# Patient Record
Sex: FEMALE | Race: BLACK OR AFRICAN AMERICAN | NOT HISPANIC OR LATINO | URBAN - METROPOLITAN AREA
[De-identification: names, ages, dates, MRNs, and addresses within clinical notes are randomized per-mention and may not be internally consistent; named-entity substitution may affect disease eponyms.]

---

## 2024-07-25 DIAGNOSIS — I71.21 ASCENDING AORTIC ANEURYSM: Primary | ICD-10-CM

## 2024-08-08 ENCOUNTER — OFFICE VISIT (OUTPATIENT)
Dept: CARDIAC SURGERY | Facility: CLINIC | Age: 74
End: 2024-08-08
Payer: MEDICARE

## 2024-08-08 VITALS
HEIGHT: 63 IN | SYSTOLIC BLOOD PRESSURE: 106 MMHG | DIASTOLIC BLOOD PRESSURE: 77 MMHG | OXYGEN SATURATION: 99 % | HEART RATE: 83 BPM | BODY MASS INDEX: 38.45 KG/M2 | WEIGHT: 217 LBS

## 2024-08-08 DIAGNOSIS — I77.810 AORTIC ECTASIA, THORACIC: ICD-10-CM

## 2024-08-08 DIAGNOSIS — I71.21 ASCENDING AORTIC ANEURYSM, UNSPECIFIED WHETHER RUPTURED: Primary | ICD-10-CM

## 2024-08-08 PROCEDURE — 99204 OFFICE O/P NEW MOD 45 MIN: CPT | Mod: ,,, | Performed by: THORACIC SURGERY (CARDIOTHORACIC VASCULAR SURGERY)

## 2024-08-08 PROCEDURE — 1126F AMNT PAIN NOTED NONE PRSNT: CPT | Mod: CPTII,,, | Performed by: THORACIC SURGERY (CARDIOTHORACIC VASCULAR SURGERY)

## 2024-08-08 PROCEDURE — 3078F DIAST BP <80 MM HG: CPT | Mod: CPTII,,, | Performed by: THORACIC SURGERY (CARDIOTHORACIC VASCULAR SURGERY)

## 2024-08-08 PROCEDURE — 1101F PT FALLS ASSESS-DOCD LE1/YR: CPT | Mod: CPTII,,, | Performed by: THORACIC SURGERY (CARDIOTHORACIC VASCULAR SURGERY)

## 2024-08-08 PROCEDURE — 1160F RVW MEDS BY RX/DR IN RCRD: CPT | Mod: CPTII,,, | Performed by: THORACIC SURGERY (CARDIOTHORACIC VASCULAR SURGERY)

## 2024-08-08 PROCEDURE — 3288F FALL RISK ASSESSMENT DOCD: CPT | Mod: CPTII,,, | Performed by: THORACIC SURGERY (CARDIOTHORACIC VASCULAR SURGERY)

## 2024-08-08 PROCEDURE — 3074F SYST BP LT 130 MM HG: CPT | Mod: CPTII,,, | Performed by: THORACIC SURGERY (CARDIOTHORACIC VASCULAR SURGERY)

## 2024-08-08 PROCEDURE — 1159F MED LIST DOCD IN RCRD: CPT | Mod: CPTII,,, | Performed by: THORACIC SURGERY (CARDIOTHORACIC VASCULAR SURGERY)

## 2024-08-08 RX ORDER — PREDNISONE 50 MG/1
TABLET ORAL
COMMUNITY
Start: 2024-06-28

## 2024-08-08 RX ORDER — OMEPRAZOLE 40 MG/1
1 CAPSULE, DELAYED RELEASE ORAL EVERY MORNING
COMMUNITY

## 2024-08-08 RX ORDER — LINACLOTIDE 145 UG/1
CAPSULE, GELATIN COATED ORAL
COMMUNITY

## 2024-08-08 RX ORDER — CLONAZEPAM 1 MG/1
TABLET ORAL
COMMUNITY

## 2024-08-08 RX ORDER — ATORVASTATIN CALCIUM 10 MG/1
1 TABLET, FILM COATED ORAL NIGHTLY
COMMUNITY

## 2024-08-08 RX ORDER — AZELASTINE 1 MG/ML
SPRAY, METERED NASAL
COMMUNITY

## 2024-08-08 RX ORDER — FUROSEMIDE 20 MG/1
TABLET ORAL
COMMUNITY
Start: 2024-03-27

## 2024-08-08 RX ORDER — DENOSUMAB 60 MG/ML
INJECTION SUBCUTANEOUS
COMMUNITY
Start: 2024-07-01

## 2024-08-08 RX ORDER — CLONIDINE HYDROCHLORIDE 0.1 MG/1
TABLET ORAL
COMMUNITY

## 2024-08-08 RX ORDER — MELOXICAM 15 MG/1
1 TABLET ORAL DAILY PRN
COMMUNITY

## 2024-08-08 RX ORDER — PREGABALIN 150 MG/1
1 CAPSULE ORAL 3 TIMES DAILY
COMMUNITY

## 2024-08-08 RX ORDER — ZOLPIDEM TARTRATE 6.25 MG/1
TABLET, FILM COATED, EXTENDED RELEASE ORAL
COMMUNITY

## 2024-08-08 RX ORDER — MONTELUKAST SODIUM 10 MG/1
1 TABLET ORAL DAILY
COMMUNITY

## 2024-08-08 RX ORDER — LISDEXAMFETAMINE DIMESYLATE 50 MG/1
CAPSULE ORAL
COMMUNITY
Start: 2024-03-13

## 2024-08-08 RX ORDER — CETIRIZINE HYDROCHLORIDE 10 MG/1
10 TABLET ORAL
COMMUNITY
Start: 2024-07-25

## 2024-08-08 RX ORDER — LEVOCETIRIZINE DIHYDROCHLORIDE 5 MG/1
1 TABLET, FILM COATED ORAL NIGHTLY
COMMUNITY

## 2024-08-08 RX ORDER — ALLOPURINOL 300 MG/1
1 TABLET ORAL DAILY
COMMUNITY
Start: 2024-03-27

## 2024-08-08 RX ORDER — AMOXICILLIN 500 MG/1
CAPSULE ORAL
COMMUNITY
Start: 2024-05-23 | End: 2024-08-08

## 2024-08-08 RX ORDER — OLMESARTAN MEDOXOMIL AND HYDROCHLOROTHIAZIDE 40/25 40; 25 MG/1; MG/1
1 TABLET ORAL DAILY
COMMUNITY
Start: 2024-03-27

## 2024-08-08 RX ORDER — AMLODIPINE BESYLATE 5 MG/1
1 TABLET ORAL DAILY
COMMUNITY

## 2024-08-08 NOTE — PROGRESS NOTES
[facility]  Cardiothoracic Surgery  Consult Note    Patient Name: Beverly Stapleton  MRN: [id number]  Date of Service: 8/11/2024  Referring Provider: Troy Garcias MD    Patient information was obtained from patient, past medical records, and primary team    Subjective:     Chief Complaint   Patient presents with    Pre-op Exam     REF DR. GARCIAS, EVAL Asc & Desc aortic aneurysm.  CTA CHEST 7/8/24- Asc Aortic Aneurysm 4.7cm, Prox desc Thoracic 3.8cm.  PMH: HTN, DYSLIPIDEMIA          History of Present Illness:  Patient is a 74-year-old woman with a recent finding of prominent ascending aorta measuring 4.7 cm on recent CTA.  This is consistent with ascending aortic ectasia.  The patient has not had an echocardiogram to assess aortic valve pathology.      She denies fever, chills, nausea, vomiting, headache, syncope, chest pain, back pain, palpitations, or family history of aortic pathology.    Current Outpatient Medications on File Prior to Visit   Medication Sig    allopurinoL (ZYLOPRIM) 300 MG tablet Take 1 tablet by mouth once daily.    amLODIPine (NORVASC) 5 MG tablet Take 1 tablet by mouth once daily.    atorvastatin (LIPITOR) 10 MG tablet Take 1 tablet by mouth every evening.    azelastine (ASTELIN) 137 mcg (0.1 %) nasal spray SPRAY TWICE IN EACH NOSTRIL EVERY MORNING    cetirizine (ZYRTEC) 10 MG tablet Take 10 mg by mouth.    clonazePAM (KLONOPIN) 1 MG tablet TAKE 1/2 TABLET BY MOUTH EVERY MORNING AND 1 TABLET EVERY NIGHT AT BEDTIME    cloNIDine (CATAPRES) 0.1 MG tablet TAKE ONE TABLET BY MOUTH EVERY 2 HOURS AS NEEDED FOR BLOOD PRESSURE GREATER THAN 170/100    furosemide (LASIX) 20 MG tablet TAKE 1 TABLET BY MOUTH DAILY FOR 5 DAYS THEN AS NEEDED SWELLING    levocetirizine (XYZAL) 5 MG tablet Take 1 tablet by mouth every evening.    LINZESS 145 mcg Cap capsule Take 1 capsule every day by oral route before meals.    montelukast (SINGULAIR) 10 mg tablet Take 1 tablet by mouth once  daily.    olmesartan-hydrochlorothiazide (BENICAR HCT) 40-25 mg per tablet Take 1 tablet by mouth once daily.    omeprazole (PRILOSEC) 40 MG capsule Take 1 capsule by mouth every morning.    predniSONE (DELTASONE) 50 MG Tab Take by mouth.    pregabalin (LYRICA) 150 MG capsule Take 1 capsule by mouth 3 (three) times daily.    PROLIA 60 mg/mL Syrg use as directed    zolpidem (AMBIEN CR) 6.25 MG CR tablet     lisdexamfetamine (VYVANSE) 50 MG capsule Take 1 capsule every day by oral route in the morning. (Patient not taking: Reported on 8/8/2024)    meloxicam (MOBIC) 15 MG tablet Take 1 tablet by mouth daily as needed. (Patient not taking: Reported on 8/8/2024)     No current facility-administered medications on file prior to visit.       Review of patient's allergies indicates:  No Known Allergies    Past Medical History:   Diagnosis Date    AAA (abdominal aortic aneurysm)     Breast cancer     Hyperlipidemia     Hypertension      Past Surgical History:   Procedure Laterality Date    CATARACT EXTRACTION      TOTAL KNEE ARTHROPLASTY       Family History    None       Tobacco Use    Smoking status: Never    Smokeless tobacco: Never   Substance and Sexual Activity    Alcohol use: Not Currently    Drug use: Not on file    Sexual activity: Not Currently     Review of Systems   Constitutional: Negative. Negative for chills, diaphoresis, fever and night sweats.   HENT:  Negative for hoarse voice, sore throat and stridor.    Eyes: Negative.  Negative for blurred vision and double vision.   Cardiovascular:  Negative for chest pain, claudication, cyanosis, dyspnea on exertion, irregular heartbeat, leg swelling, orthopnea, palpitations, paroxysmal nocturnal dyspnea and syncope.   Respiratory:  Negative for cough, hemoptysis, shortness of breath, sputum production and wheezing.    Endocrine: Negative for polydipsia and polyuria.   Hematologic/Lymphatic: Negative for adenopathy and bleeding problem.   Gastrointestinal:  Negative  for abdominal pain, diarrhea, heartburn, hematemesis, hematochezia, nausea and vomiting.   Neurological:  Negative for brief paralysis, disturbances in coordination, dizziness, focal weakness, headaches, numbness and paresthesias.     Objective:     Vitals:    08/08/24 1030   BP: 106/77   Pulse: 83        Weight: 98.4 kg (217 lb)  Body mass index is 38.44 kg/m².     STS Risk Score:  Not applicable    Physical Exam  Vitals and nursing note reviewed.   Constitutional:       Appearance: Normal appearance. She is obese.   HENT:      Head: Normocephalic and atraumatic.      Mouth/Throat:      Mouth: Mucous membranes are moist.   Eyes:      Extraocular Movements: Extraocular movements intact.      Conjunctiva/sclera: Conjunctivae normal.      Pupils: Pupils are equal, round, and reactive to light.   Neck:      Vascular: No carotid bruit.   Cardiovascular:      Rate and Rhythm: Normal rate and regular rhythm.      Pulses: Normal pulses.      Heart sounds: Normal heart sounds. No murmur heard.     No friction rub. No gallop.   Pulmonary:      Effort: Pulmonary effort is normal. No respiratory distress.      Breath sounds: Normal breath sounds. No wheezing, rhonchi or rales.   Abdominal:      General: Abdomen is flat. Bowel sounds are normal. There is no distension.      Palpations: Abdomen is soft. There is no mass.      Tenderness: There is no abdominal tenderness.   Musculoskeletal:      Cervical back: Normal range of motion and neck supple.   Lymphadenopathy:      Cervical: No cervical adenopathy.   Skin:     General: Skin is warm and dry.      Capillary Refill: Capillary refill takes less than 2 seconds.      Findings: No erythema or rash.   Neurological:      General: No focal deficit present.      Mental Status: She is alert and oriented to person, place, and time. Mental status is at baseline.          Significant Labs:  Reviewed    Significant Diagnostics:  Reviewed    Assessment/Plan:     Problem List Items  Addressed This Visit          Cardiac/Vascular    Aortic ectasia, thoracic     Recommend echocardiogram to assess aortic valve pathology.  Return to clinic in 6 months with CTA of the chest.          Other Visit Diagnoses       Ascending aortic aneurysm, unspecified whether ruptured    -  Primary    Relevant Orders    Echo    CTA Chest Aorta Non Coronary             Thank you for your consult. I will follow-up with patient. Please contact us if you have any additional questions.    PARIS Wetzel MD, FACC, FACS  Cardiothoracic Surgery  [facility]

## 2024-08-11 PROBLEM — I77.810 AORTIC ECTASIA, THORACIC: Status: ACTIVE | Noted: 2024-08-11

## 2024-08-11 NOTE — ASSESSMENT & PLAN NOTE
Recommend echocardiogram to assess aortic valve pathology.  Return to clinic in 6 months with CTA of the chest.

## 2025-02-07 ENCOUNTER — HOSPITAL ENCOUNTER (OUTPATIENT)
Dept: RADIOLOGY | Facility: HOSPITAL | Age: 75
Discharge: HOME | End: 2025-02-07
Attending: THORACIC SURGERY (CARDIOTHORACIC VASCULAR SURGERY)
Payer: MEDICARE

## 2025-02-07 DIAGNOSIS — I71.21 ASCENDING AORTIC ANEURYSM, UNSPECIFIED WHETHER RUPTURED: ICD-10-CM

## 2025-02-07 PROCEDURE — 71275 CT ANGIOGRAPHY CHEST: CPT | Mod: TC

## 2025-02-07 PROCEDURE — 25500020 PHARM REV CODE 255: Performed by: THORACIC SURGERY (CARDIOTHORACIC VASCULAR SURGERY)

## 2025-02-07 RX ADMIN — IOHEXOL 100 ML: 350 INJECTION, SOLUTION INTRAVENOUS at 08:02

## 2025-05-15 ENCOUNTER — OFFICE VISIT (OUTPATIENT)
Dept: CARDIAC SURGERY | Facility: CLINIC | Age: 75
End: 2025-05-15
Payer: MEDICARE

## 2025-05-15 VITALS
HEIGHT: 63 IN | WEIGHT: 200 LBS | OXYGEN SATURATION: 97 % | HEART RATE: 86 BPM | SYSTOLIC BLOOD PRESSURE: 128 MMHG | DIASTOLIC BLOOD PRESSURE: 88 MMHG | BODY MASS INDEX: 35.44 KG/M2

## 2025-05-15 DIAGNOSIS — I77.810 AORTIC ECTASIA, THORACIC: ICD-10-CM

## 2025-05-15 DIAGNOSIS — I71.21 ASCENDING AORTIC ANEURYSM, UNSPECIFIED WHETHER RUPTURED: Primary | ICD-10-CM

## 2025-05-15 PROCEDURE — 1160F RVW MEDS BY RX/DR IN RCRD: CPT | Mod: CPTII,,, | Performed by: THORACIC SURGERY (CARDIOTHORACIC VASCULAR SURGERY)

## 2025-05-15 PROCEDURE — 3079F DIAST BP 80-89 MM HG: CPT | Mod: CPTII,,, | Performed by: THORACIC SURGERY (CARDIOTHORACIC VASCULAR SURGERY)

## 2025-05-15 PROCEDURE — 1159F MED LIST DOCD IN RCRD: CPT | Mod: CPTII,,, | Performed by: THORACIC SURGERY (CARDIOTHORACIC VASCULAR SURGERY)

## 2025-05-15 PROCEDURE — 1126F AMNT PAIN NOTED NONE PRSNT: CPT | Mod: CPTII,,, | Performed by: THORACIC SURGERY (CARDIOTHORACIC VASCULAR SURGERY)

## 2025-05-15 PROCEDURE — 1101F PT FALLS ASSESS-DOCD LE1/YR: CPT | Mod: CPTII,,, | Performed by: THORACIC SURGERY (CARDIOTHORACIC VASCULAR SURGERY)

## 2025-05-15 PROCEDURE — 99214 OFFICE O/P EST MOD 30 MIN: CPT | Mod: ,,, | Performed by: THORACIC SURGERY (CARDIOTHORACIC VASCULAR SURGERY)

## 2025-05-15 PROCEDURE — 3288F FALL RISK ASSESSMENT DOCD: CPT | Mod: CPTII,,, | Performed by: THORACIC SURGERY (CARDIOTHORACIC VASCULAR SURGERY)

## 2025-05-15 PROCEDURE — 3074F SYST BP LT 130 MM HG: CPT | Mod: CPTII,,, | Performed by: THORACIC SURGERY (CARDIOTHORACIC VASCULAR SURGERY)

## 2025-05-20 NOTE — ASSESSMENT & PLAN NOTE
Recommend echocardiogram to assess aortic valve pathology.  Return to clinic in 1y with CTA of the chest.

## 2025-05-20 NOTE — PROGRESS NOTES
[facility]  Cardiothoracic Surgery  Consult Note    Patient Name: Beverly Stapleton  MRN: [id number]  Date of Service: 5/20/2025  Referring Provider: Order, Paper    Patient information was obtained from patient, past medical records, and primary team    Subjective:     Chief Complaint   Patient presents with    Follow-up     ^6 MO F/U WITH RESULTS CTA CHEST   CTA CHEST AO (2/10/2025): 4.7cm  PREVIOUS CTA CHEST 7/8/24- Asc Aortic Aneurysm 4.7cm, Prox desc Thoracic 3.8cm.  PMH: HTN, DYSLIPIDEMIA          History of Present Illness:  Patient is a 74-year-old woman with a recent finding of prominent ascending aorta measuring 4.7 cm on recent CTA.  This is consistent with ascending aortic ectasia.  The patient has not had an echocardiogram to assess aortic valve pathology.      She denies fever, chills, nausea, vomiting, headache, syncope, chest pain, back pain, palpitations, or family history of aortic pathology.    Current Outpatient Medications on File Prior to Visit   Medication Sig    allopurinoL (ZYLOPRIM) 300 MG tablet Take 1 tablet by mouth once daily.    amLODIPine (NORVASC) 5 MG tablet Take 1 tablet by mouth once daily.    atorvastatin (LIPITOR) 10 MG tablet Take 1 tablet by mouth every evening.    azelastine (ASTELIN) 137 mcg (0.1 %) nasal spray SPRAY TWICE IN EACH NOSTRIL EVERY MORNING    cetirizine (ZYRTEC) 10 MG tablet Take 10 mg by mouth.    clonazePAM (KLONOPIN) 1 MG tablet TAKE 1/2 TABLET BY MOUTH EVERY MORNING AND 1 TABLET EVERY NIGHT AT BEDTIME    cloNIDine (CATAPRES) 0.1 MG tablet TAKE ONE TABLET BY MOUTH EVERY 2 HOURS AS NEEDED FOR BLOOD PRESSURE GREATER THAN 170/100    furosemide (LASIX) 20 MG tablet TAKE 1 TABLET BY MOUTH DAILY FOR 5 DAYS THEN AS NEEDED SWELLING    levocetirizine (XYZAL) 5 MG tablet Take 1 tablet by mouth every evening.    LINZESS 145 mcg Cap capsule Take 1 capsule every day by oral route before meals.    montelukast (SINGULAIR) 10 mg tablet Take 1 tablet by  mouth once daily.    olmesartan-hydrochlorothiazide (BENICAR HCT) 40-25 mg per tablet Take 1 tablet by mouth once daily.    omeprazole (PRILOSEC) 40 MG capsule Take 1 capsule by mouth every morning.    predniSONE (DELTASONE) 50 MG Tab Take by mouth.    pregabalin (LYRICA) 150 MG capsule Take 1 capsule by mouth 3 (three) times daily.    PROLIA 60 mg/mL Syrg use as directed    zolpidem (AMBIEN CR) 6.25 MG CR tablet     lisdexamfetamine (VYVANSE) 50 MG capsule Take 1 capsule every day by oral route in the morning. (Patient not taking: Reported on 5/15/2025)    meloxicam (MOBIC) 15 MG tablet Take 1 tablet by mouth daily as needed. (Patient not taking: Reported on 5/15/2025)     No current facility-administered medications on file prior to visit.       Review of patient's allergies indicates:  No Known Allergies    Past Medical History:   Diagnosis Date    AAA (abdominal aortic aneurysm)     Breast cancer     Hyperlipidemia     Hypertension      Past Surgical History:   Procedure Laterality Date    CATARACT EXTRACTION      PARATHYROIDECTOMY Right     TOTAL KNEE ARTHROPLASTY       Family History    None       Tobacco Use    Smoking status: Never    Smokeless tobacco: Never   Substance and Sexual Activity    Alcohol use: Not Currently    Drug use: Not on file    Sexual activity: Not Currently     Review of Systems   Constitutional: Negative. Negative for chills, diaphoresis, fever and night sweats.   HENT:  Negative for hoarse voice, sore throat and stridor.    Eyes: Negative.  Negative for blurred vision and double vision.   Cardiovascular:  Negative for chest pain, claudication, cyanosis, dyspnea on exertion, irregular heartbeat, leg swelling, orthopnea, palpitations, paroxysmal nocturnal dyspnea and syncope.   Respiratory:  Negative for cough, hemoptysis, shortness of breath, sputum production and wheezing.    Endocrine: Negative for polydipsia and polyuria.   Hematologic/Lymphatic: Negative for adenopathy and bleeding  problem.   Gastrointestinal:  Negative for abdominal pain, diarrhea, heartburn, hematemesis, hematochezia, nausea and vomiting.   Neurological:  Negative for brief paralysis, disturbances in coordination, dizziness, focal weakness, headaches, numbness and paresthesias.     Objective:     Vitals:    05/15/25 0946   BP: 128/88   Pulse: 86        Weight: 90.7 kg (200 lb)  Body mass index is 35.43 kg/m².     STS Risk Score:  Not applicable    Physical Exam  Vitals and nursing note reviewed.   Constitutional:       Appearance: Normal appearance. She is obese.   HENT:      Head: Normocephalic and atraumatic.      Mouth/Throat:      Mouth: Mucous membranes are moist.   Eyes:      Extraocular Movements: Extraocular movements intact.      Conjunctiva/sclera: Conjunctivae normal.      Pupils: Pupils are equal, round, and reactive to light.   Neck:      Vascular: No carotid bruit.   Cardiovascular:      Rate and Rhythm: Normal rate and regular rhythm.      Pulses: Normal pulses.      Heart sounds: Normal heart sounds. No murmur heard.     No friction rub. No gallop.   Pulmonary:      Effort: Pulmonary effort is normal. No respiratory distress.      Breath sounds: Normal breath sounds. No wheezing, rhonchi or rales.   Abdominal:      General: Abdomen is flat. Bowel sounds are normal. There is no distension.      Palpations: Abdomen is soft. There is no mass.      Tenderness: There is no abdominal tenderness.   Musculoskeletal:      Cervical back: Normal range of motion and neck supple.   Lymphadenopathy:      Cervical: No cervical adenopathy.   Skin:     General: Skin is warm and dry.      Capillary Refill: Capillary refill takes less than 2 seconds.      Findings: No erythema or rash.   Neurological:      General: No focal deficit present.      Mental Status: She is alert and oriented to person, place, and time. Mental status is at baseline.          Significant Labs:  Reviewed    Significant  Diagnostics:  Reviewed    Assessment/Plan:     Problem List Items Addressed This Visit       Aortic ectasia, thoracic    Recommend echocardiogram to assess aortic valve pathology.  Return to clinic in 1y with CTA of the chest.          Other Visit Diagnoses         Ascending aortic aneurysm, unspecified whether ruptured    -  Primary    Relevant Orders    CTA Chest Aorta Non Coronary             Thank you for your consult. I will follow-up with patient. Please contact us if you have any additional questions.    PARIS Wetzel MD, FACC, FACS  Cardiothoracic Surgery  [facility]